# Patient Record
Sex: FEMALE | Race: WHITE | NOT HISPANIC OR LATINO | Employment: UNEMPLOYED | URBAN - METROPOLITAN AREA
[De-identification: names, ages, dates, MRNs, and addresses within clinical notes are randomized per-mention and may not be internally consistent; named-entity substitution may affect disease eponyms.]

---

## 2020-02-03 VITALS
SYSTOLIC BLOOD PRESSURE: 116 MMHG | DIASTOLIC BLOOD PRESSURE: 55 MMHG | TEMPERATURE: 98.2 F | BODY MASS INDEX: 26.68 KG/M2 | WEIGHT: 170 LBS | RESPIRATION RATE: 18 BRPM | OXYGEN SATURATION: 98 % | HEART RATE: 84 BPM | HEIGHT: 67 IN

## 2020-02-03 PROCEDURE — 99283 EMERGENCY DEPT VISIT LOW MDM: CPT

## 2020-02-04 ENCOUNTER — HOSPITAL ENCOUNTER (EMERGENCY)
Facility: HOSPITAL | Age: 25
Discharge: HOME | End: 2020-02-04
Attending: EMERGENCY MEDICINE | Admitting: EMERGENCY MEDICINE
Payer: COMMERCIAL

## 2020-02-04 ENCOUNTER — APPOINTMENT (EMERGENCY)
Dept: RADIOLOGY | Facility: HOSPITAL | Age: 25
End: 2020-02-04
Payer: COMMERCIAL

## 2020-02-04 DIAGNOSIS — S69.92XA INJURY TO FINGERNAIL OF LEFT HAND, INITIAL ENCOUNTER: Primary | ICD-10-CM

## 2020-02-04 PROCEDURE — 99284 EMERGENCY DEPT VISIT MOD MDM: CPT | Performed by: EMERGENCY MEDICINE

## 2020-02-04 PROCEDURE — 73140 X-RAY EXAM OF FINGER(S): CPT

## 2020-02-04 NOTE — ED PROVIDER NOTES
Pt Name: Marycruz Archuleta  MRN: [id number]  [place] 1995  Age/Sex: 21 y o  female  Date of evaluation: 2/3/2020  PCP: No primary care provider on file  CHIEF COMPLAINT    Chief Complaint   Patient presents with    Finger Injury     Patient reports she got her L hanf fourth digit fake nail stuck in a mixer and reports pain  HPI    21 y o  female presenting with pain in her left 4th fingernail  Patient states she has glue on fake nails, got that nail caught in a mixer, partially tearing and off  She now complains of severe pain to the area and is afraid that her fingernail may fall off  The pain is moderate intensity, sharp, in the finger tip, nonradiating, worse with pressing on the area and better at rest   She denies any other pain or injuries      HPI      Past Medical and Surgical History    History reviewed  No pertinent past medical history  History reviewed  No pertinent surgical history  History reviewed  No pertinent family history  Social History     Tobacco Use    Smoking status: Never Smoker    Smokeless tobacco: Never Used   Substance Use Topics    Alcohol use: Yes    Drug use: Never           Allergies    No Known Allergies    Home Medications    Prior to Admission medications    Not on File           Review of Systems    Review of Systems   Constitutional: Negative for activity change, chills and fever  HENT: Negative for drooling and facial swelling  Eyes: Negative for pain, discharge and visual disturbance  Respiratory: Negative for apnea, cough, chest tightness, shortness of breath and wheezing  Cardiovascular: Negative for chest pain and leg swelling  Gastrointestinal: Negative for abdominal pain, constipation, diarrhea, nausea and vomiting  Genitourinary: Negative for difficulty urinating, dysuria and urgency  Musculoskeletal: Negative for arthralgias, back pain and gait problem  Skin: Positive for wound  Negative for color change and rash  Neurological: Negative for dizziness, speech difficulty, weakness and headaches  Psychiatric/Behavioral: Negative for agitation, behavioral problems and confusion  All other systems reviewed and negative  Physical Exam      ED Triage Vitals [02/03/20 2330]   Temperature Pulse Respirations Blood Pressure SpO2   98 2 °F (36 8 °C) 84 18 116/55 98 %      Temp Source Heart Rate Source Patient Position - Orthostatic VS BP Location FiO2 (%)   Oral Monitor Lying Left arm --      Pain Score       6               Physical Exam   Constitutional: She is oriented to person, place, and time  She appears well-developed and well-nourished  HENT:   Head: Normocephalic and atraumatic  Eyes: Pupils are equal, round, and reactive to light  Conjunctivae and EOM are normal    Neck: Normal range of motion  Neck supple  Cardiovascular: Normal rate, regular rhythm, normal heart sounds and intact distal pulses  Pulmonary/Chest: Effort normal and breath sounds normal  No respiratory distress  She has no wheezes  She has no rales  Musculoskeletal: Normal range of motion  She exhibits tenderness  She exhibits no edema or deformity  Distal 25% of fingernail avulsed from the bed of the 4th finger on the left hand  Hemostatic  Tender to palpation  Neurological: She is alert and oriented to person, place, and time  Skin: Skin is warm and dry  No rash noted  No erythema  Psychiatric: She has a normal mood and affect  Her behavior is normal  Judgment and thought content normal    Nursing note and vitals reviewed  Diagnostic Results      Labs:    Results Reviewed     None          All labs reviewed and utilized in the medical decision making process    Radiology:    XR finger fourth digit-ring LEFT    (Results Pending)     No acute fracture or dislocation      All radiology studies independently viewed by me and interpreted by the radiologist     Procedure    Procedures    After discussion of risks and benefits, nail secured with medical adhesive applied circumferentially around the nail bed  ED Course of Care and Re-Assessments        Medications - No data to display        FINAL IMPRESSION    Final diagnoses:   Injury to fingernail of left hand, initial encounter         DISPOSITION/PLAN    Partial avulsion of the fingernail of the left 4th digit as above  No other associated injuries, no fracture seen on x-rays  Wound cleansed and nail secured with medical adhesive, placed in splint  Discharged strict return precautions, follow up primary care doctor  Time reflects when diagnosis was documented in both MDM as applicable and the Disposition within this note     Time User Action Codes Description Comment    2/4/2020  1:15 AM Desiree Cheng Add [S69 [id number]] Injury to fingernail of left hand, initial encounter       ED Disposition     ED Disposition Condition Date/Time Comment    Discharge Stable Tu Feb 4, 2020  1:14 AM Enid Said discharge to home/self care  Follow-up Information     Follow up With Specialties Details Why Contact Info Additional [address] [facility] Emergency Department Emergency Medicine Go to  If symptoms worsen [address] [facility] [address], [place], [address]    Your primary care doctor  Call in 1 day TO discuss this visit and schedule followup as needed              PATIENT REFERRED TO:    [address] Emergency Department  [address] [facility] [address] [phone]  Go to   If symptoms worsen    Your primary care doctor    Call in 1 day  TO discuss this visit and schedule followup as needed      DISCHARGE MEDICATIONS:    Patient's Medications    No medications on file       No discharge procedures on file           MD Angela Schmitz MD  02/04/20 0229

## 2020-06-16 ENCOUNTER — HOSPITAL ENCOUNTER (EMERGENCY)
Facility: HOSPITAL | Age: 25
Discharge: HOME | End: 2020-06-16
Attending: EMERGENCY MEDICINE | Admitting: EMERGENCY MEDICINE
Payer: COMMERCIAL

## 2020-06-16 VITALS
TEMPERATURE: 97.9 F | HEART RATE: 87 BPM | RESPIRATION RATE: 22 BRPM | OXYGEN SATURATION: 100 % | DIASTOLIC BLOOD PRESSURE: 88 MMHG | SYSTOLIC BLOOD PRESSURE: 130 MMHG

## 2020-06-16 DIAGNOSIS — N93.9 VAGINAL BLEEDING: Primary | ICD-10-CM

## 2020-06-16 PROCEDURE — 99284 EMERGENCY DEPT VISIT MOD MDM: CPT | Performed by: PHYSICIAN ASSISTANT

## 2020-06-16 PROCEDURE — 99283 EMERGENCY DEPT VISIT LOW MDM: CPT

## 2022-08-12 ENCOUNTER — OFFICE VISIT (OUTPATIENT)
Dept: URGENT CARE | Facility: CLINIC | Age: 27
End: 2022-08-12
Payer: COMMERCIAL

## 2022-08-12 VITALS
DIASTOLIC BLOOD PRESSURE: 70 MMHG | TEMPERATURE: 98.1 F | HEART RATE: 72 BPM | HEIGHT: 69 IN | BODY MASS INDEX: 31.55 KG/M2 | WEIGHT: 213 LBS | RESPIRATION RATE: 16 BRPM | OXYGEN SATURATION: 99 % | SYSTOLIC BLOOD PRESSURE: 120 MMHG

## 2022-08-12 DIAGNOSIS — S39.012A STRAIN OF MUSCLE, FASCIA AND TENDON OF LOWER BACK, INITIAL ENCOUNTER: Primary | ICD-10-CM

## 2022-08-12 PROCEDURE — 99213 OFFICE O/P EST LOW 20 MIN: CPT | Performed by: PHYSICIAN ASSISTANT

## 2022-08-12 RX ORDER — CYCLOBENZAPRINE HCL 10 MG
10 TABLET ORAL 3 TIMES DAILY PRN
Qty: 21 TABLET | Refills: 0 | Status: SHIPPED | OUTPATIENT
Start: 2022-08-12

## 2022-08-12 RX ORDER — SERTRALINE HYDROCHLORIDE 100 MG/1
100 TABLET, FILM COATED ORAL DAILY
COMMUNITY
Start: 2022-07-17

## 2022-08-12 RX ORDER — PREDNISONE 20 MG/1
40 TABLET ORAL DAILY
Qty: 10 TABLET | Refills: 0 | Status: SHIPPED | OUTPATIENT
Start: 2022-08-12 | End: 2022-08-17

## 2022-08-12 NOTE — PROGRESS NOTES
[facility]        NAME: Dyana Angel is a 31 y o  female  : 1995    MRN: [id number]  DATE: 2022  TIME: 6:40 PM    Assessment and Plan   Strain of muscle, fascia and tendon of lower back, initial encounter [S39 012A]  1  Strain of muscle, fascia and tendon of lower back, initial encounter  predniSONE 20 mg tablet    cyclobenzaprine (FLEXERIL) 10 mg tablet         Patient Instructions     Continue to monitor symptoms  If new or worsening symptoms develop, go immediately to Er  Drink plenty of fluids  Follow up with Family Doctor this week  Chief Complaint     Chief Complaint   Patient presents with    Back Pain     4 days covid + has terrible back pain, half way up back and hip area  History of Present Illness       Back Pain  This is a new problem  Episode onset: New onset low back pain started about 2 days ago  At 1st felt like she needed to [address] her back since then that has progressed to pain and tightness across low back  The problem occurs constantly  The problem has been gradually worsening since onset  The pain is present in the lumbar spine  The quality of the pain is described as aching  The pain does not radiate  The pain is severe  The pain is the same all the time  The symptoms are aggravated by twisting, bending and standing  Pertinent negatives include no abdominal pain, chest pain, dysuria, fever, numbness, pelvic pain or weakness  She has tried NSAIDs for the symptoms  The treatment provided mild relief  Patient was diagnosed with COVID last Saturday, 6 days ago  Patient cannot think of any activity that triggered this  Patient works at [employer] and states she does physical activity all the time but nothing new or strenuous    Review of Systems   Review of Systems   Constitutional: Negative  Negative for chills, fatigue and fever  HENT: Negative  Eyes: Negative  Respiratory: Negative    Negative for chest tightness, shortness of breath and wheezing  Cardiovascular: Negative  Negative for chest pain and palpitations  Gastrointestinal: Negative for abdominal pain, constipation, diarrhea, nausea and vomiting  Endocrine: Negative  Genitourinary: Negative for dysuria, flank pain, frequency, pelvic pain, vaginal discharge and vaginal pain  Musculoskeletal: Positive for back pain  Negative for gait problem, neck pain and neck stiffness  Skin: Negative  Allergic/Immunologic: Negative  Neurological: Negative  Negative for weakness and numbness  Hematological: Negative  Psychiatric/Behavioral: Negative  Current Medications       Current Outpatient Medications:     cyclobenzaprine (FLEXERIL) 10 mg tablet, Take 1 tablet (10 mg total) by mouth 3 (three) times a day as needed for muscle spasms, Disp: 21 tablet, Rfl: 0    predniSONE 20 mg tablet, Take 2 tablets (40 mg total) by mouth daily for 5 days, Disp: 10 tablet, Rfl: 0    sertraline (ZOLOFT) 100 mg tablet, Take 100 mg by mouth daily, Disp: , Rfl:     Current Allergies     Allergies as of 08/12/2022    (No Known Allergies)            The following portions of the patient's history were reviewed and updated as appropriate: allergies, current medications, past family history, past medical history, past social history, past surgical history and problem list      Past Medical History:   Diagnosis Date    No pertinent past medical history        Past Surgical History:   Procedure Laterality Date    MOUTH SURGERY  2021       History reviewed  No pertinent family history  Medications have been verified  Objective   /70   Pulse 72   Temp 98 1 °F (36 7 °C)   Resp 16   Ht 5' 9" (1 753 m)   Wt 96 6 kg (213 lb)   LMP 08/04/2022   SpO2 99%   BMI 31 45 kg/m²        Physical Exam     Physical Exam  Vitals and nursing note reviewed  Constitutional:       General: She is not in acute distress  Appearance: Normal appearance  She is well-developed   She is not ill-appearing or diaphoretic  HENT:      Head: Normocephalic and atraumatic  Cardiovascular:      Rate and Rhythm: Normal rate and regular rhythm  Heart sounds: Normal heart sounds  Pulmonary:      Effort: Pulmonary effort is normal  No respiratory distress  Breath sounds: Normal breath sounds  No wheezing, rhonchi or rales  Abdominal:      General: Bowel sounds are normal  There is no distension  Palpations: Abdomen is soft  Tenderness: There is no abdominal tenderness  There is no right CVA tenderness, left CVA tenderness, guarding or rebound  Comments: Abdominal exam completely denies   Musculoskeletal:      Comments: No midline point tenderness  Bilateral mild paraspinal muscle tenderness  Full range of motion of lumbar spine, worse with twisting in either direction  Negative straight leg raise  Negative sitting root  Strength sensation intact and symmetrical to lower extremities bilaterally  Skin:     General: Skin is warm  Coloration: Skin is not pale  Findings: No rash  Neurological:      Mental Status: She is alert

## 2022-08-12 NOTE — PATIENT INSTRUCTIONS
Continue to monitor symptoms  If new or worsening symptoms develop, go immediately to Er  Drink plenty of fluids  Follow up with Family Doctor this week  Low Back Strain   WHAT YOU NEED TO KNOW:   Low back strain is an injury to your lower back muscles or tendons  Tendons are strong tissues that connect muscles to bones  The lower back supports most of your body weight and helps you move, twist, and bend  DISCHARGE INSTRUCTIONS:   Return to the emergency department if:   You hear or feel a pop in your lower back  You have increased swelling or pain in your lower back  You have trouble moving your legs  Your legs are numb  Call your doctor if:   You have a fever  Your pain does not go away, even after treatment  You have questions or concerns about your condition or care  Medicines: The following medicines may be ordered by your healthcare provider:  Acetaminophen  decreases pain and fever  It is available without a doctor's order  Ask how much to take and how often to take it  Follow directions  Read the labels of all other medicines you are using to see if they also contain acetaminophen, or ask your doctor or pharmacist  Acetaminophen can cause liver damage if not taken correctly  Do not use more than 4 grams (4,000 milligrams) total of acetaminophen in one day  NSAIDs , such as ibuprofen, help decrease swelling, pain, and fever  This medicine is available with or without a doctor's order  NSAIDs can cause stomach bleeding or kidney problems in certain people  If you take blood thinner medicine, always ask your healthcare provider if NSAIDs are safe for you  Always read the medicine label and follow directions  Muscle relaxers  help decrease pain and muscle spasms  Prescription pain medicine  may be given  Ask your healthcare provider how to take this medicine safely  Some prescription pain medicines contain acetaminophen   Do not take other medicines that contain acetaminophen without talking to your healthcare provider  Too much acetaminophen may cause liver damage  Prescription pain medicine may cause constipation  Ask your healthcare provider how to prevent or treat constipation  Take your medicine as directed  Contact your healthcare provider if you think your medicine is not helping or if you have side effects  Tell him or her if you are allergic to any medicine  Keep a list of the medicines, vitamins, and herbs you take  Include the amounts, and when and why you take them  Bring the list or the pill bottles to follow-up visits  Carry your medicine list with you in case of an emergency  Self-care:   Rest  as directed  You may need to rest in bed for a period of time after your injury  Do not lift heavy objects  Apply ice  on your back for 15 to 20 minutes every hour or as directed  Use an ice pack, or put crushed ice in a plastic bag  Cover it with a towel  Ice helps prevent tissue damage and decreases swelling and pain  Apply heat  on your lower back for 20 to 30 minutes every 2 hours for as many days as directed  Heat helps decrease pain and muscle spasms  Slowly start to increase your activity  as the pain decreases, or as directed  Prevent another low back strain:   Use correct body movements  Bend at the hips and knees when you  objects  Do not bend from the waist  Use your leg muscles as you lift the load  Do not use your back  Keep the object close to your chest as you lift it  Try not to twist or lift anything above your waist          Change your position often when you stand for long periods of time  Rest one foot on a small box or footrest, and then switch to the other foot often  Try not to sit for long periods of time  When you do, sit in a straight-backed chair with your feet flat on the floor  Never reach, pull, or push while you are sitting  Warm up before you exercise  Do exercises that strengthen your back muscles   Ask your healthcare provider about the best exercise plan for you  Maintain a healthy weight  Ask your healthcare provider how much you should weigh  Ask him to help you create a weight loss plan if you are overweight  © Copyright [employer] 2022 Information is for End User's use only and may not be sold, redistributed or otherwise used for commercial purposes  All illustrations and images included in CareNotes® are the copyrighted property of A D A M , Inc  or [facility] Cherelle Lock   The above information is an  only  It is not intended as medical advice for individual conditions or treatments  Talk to your doctor, nurse or pharmacist before following any medical regimen to see if it is safe and effective for you

## 2023-03-11 ENCOUNTER — OFFICE VISIT (OUTPATIENT)
Dept: URGENT CARE | Facility: CLINIC | Age: 28
End: 2023-03-11

## 2023-03-11 VITALS
TEMPERATURE: 97.9 F | DIASTOLIC BLOOD PRESSURE: 88 MMHG | SYSTOLIC BLOOD PRESSURE: 129 MMHG | RESPIRATION RATE: 16 BRPM | OXYGEN SATURATION: 98 % | HEART RATE: 84 BPM
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
## 2023-03-11 DIAGNOSIS — S00.469A: ICD-10-CM

## 2023-03-11 DIAGNOSIS — R59.0 POSTERIOR AURICULAR LYMPHADENOPATHY: Primary | ICD-10-CM

## 2023-03-11 RX ORDER — METHYLPREDNISOLONE 4 MG/1
TABLET ORAL
Qty: 21 TABLET | Refills: 0 | Status: SHIPPED | OUTPATIENT
Start: 2023-03-11

## 2023-03-11 RX ORDER — CEPHALEXIN 500 MG/1
500 CAPSULE ORAL 4 TIMES DAILY
Qty: 40 CAPSULE | Refills: 0 | Status: SHIPPED | OUTPATIENT
Start: 2023-03-11 | End: 2023-03-21

## 2023-03-11 NOTE — PROGRESS NOTES
[facility]        NAME: Linette Garcia is a 28 y o  female  : 1995    MRN: [id number]  DATE: 2023  TIME: 12:28 PM    Assessment and Plan   Posterior auricular lymphadenopathy [R59 0]  1  Posterior auricular lymphadenopathy  cephalexin (KEFLEX) 500 mg capsule    vs cyst/lipoma      2  Insect bite of ear region, initial encounter  cephalexin (KEFLEX) 500 mg capsule    methylPREDNISolone (Medrol) 4 MG tablet therapy pack            Patient Instructions   Patient Instructions   1  Take meds as instructed  2  F/u with ENT in 1-2 weeks  3  F/u with PCP in 2-3 days        Follow up with PCP in 3-5 days  Proceed to  ER if symptoms worsen  Chief Complaint     Chief Complaint   Patient presents with   • Rash     To the touch achy rash above right ear since Tuesday  Also reports woozy feeling since Tuesday  Has vomited x2  Here for second opinion  History of Present Illness       Planing of a bump on the side of her head  Patient states she went to urgent care in [facility] yesterday and they told her she probably has shingles  Patient just wanted a second opinion  Symptoms started on Tuesday  Pt  Denies any burning, or pain, or any other symptoms  Review of Systems   Review of Systems   Constitutional: Negative for chills and fever  HENT: Negative for congestion and rhinorrhea  R side of head:  + swelling and palpable "lump" , 1" x 1", to superior aspect of upper ear region/R temporal area, with erythema; no evidence of vesicles  There is a bright erythematous area to posterior auricular region, possibly consistent with an "insect bite"  Eyes: Negative for discharge and visual disturbance  Respiratory: Negative for shortness of breath and wheezing  Cardiovascular: Negative for chest pain and palpitations  Gastrointestinal: Negative for abdominal pain and vomiting  Endocrine: Negative for polydipsia and polyuria     Genitourinary: Negative for dysuria and hematuria  Musculoskeletal: Negative for arthralgias, gait problem and neck stiffness  Skin: Positive for color change and rash  Negative for wound  Neurological: Negative for dizziness and headaches  Psychiatric/Behavioral: Negative for confusion and suicidal ideas  Current Medications       Current Outpatient Medications:   •  cephalexin (KEFLEX) 500 mg capsule, Take 1 capsule (500 mg total) by mouth 4 (four) times a day for 10 days, Disp: 40 capsule, Rfl: 0  •  methylPREDNISolone (Medrol) 4 MG tablet therapy pack, Use as directed on package, Disp: 21 tablet, Rfl: 0  •  sertraline (ZOLOFT) 100 mg tablet, Take 100 mg by mouth daily, Disp: , Rfl:   •  cyclobenzaprine (FLEXERIL) 10 mg tablet, Take 1 tablet (10 mg total) by mouth 3 (three) times a day as needed for muscle spasms (Patient not taking: Reported on 3/11/2023), Disp: 21 tablet, Rfl: 0    Current Allergies     Allergies as of 03/11/2023   • (No Known Allergies)            The following portions of the patient's history were reviewed and updated as appropriate: allergies, current medications, past family history, past medical history, past social history, past surgical history and problem list      Past Medical History:   Diagnosis Date   • No pertinent past medical history        Past Surgical History:   Procedure Laterality Date   • MOUTH SURGERY  2021       History reviewed  No pertinent family history  Medications have been verified  Objective   /88   Pulse 84   Temp 97 9 °F (36 6 °C) (Temporal)   Resp 16   LMP 03/03/2023 (Exact Date)   SpO2 98%        Physical Exam     Physical Exam  Vitals and nursing note reviewed  Constitutional:       Appearance: Normal appearance  HENT:      Head: Normocephalic and atraumatic        Right Ear: Tympanic membrane, ear canal and external ear normal       Left Ear: Tympanic membrane, ear canal and external ear normal    Eyes:      Extraocular Movements: Extraocular movements intact  Conjunctiva/sclera: Conjunctivae normal    Cardiovascular:      Rate and Rhythm: Normal rate  Pulmonary:      Effort: Pulmonary effort is normal    Musculoskeletal:      Cervical back: Normal range of motion  Skin:     Findings: Erythema and lesion present  Comments: See photos below of R posterior auricular region   Neurological:      General: No focal deficit present  Mental Status: She is alert  Mental status is at baseline     Psychiatric:         Mood and Affect: Mood normal

## 2023-05-02 LAB
EXTERNAL HIV SCREEN: NORMAL
HCV AB SER-ACNC: NON REACTIVE

## 2023-05-15 ENCOUNTER — OFFICE VISIT (OUTPATIENT)
Dept: OBGYN CLINIC | Facility: CLINIC | Age: 28
End: 2023-05-15

## 2023-05-15 VITALS
BODY MASS INDEX: 33.11 KG/M2 | WEIGHT: 206 LBS | HEIGHT: 66 IN | DIASTOLIC BLOOD PRESSURE: 70 MMHG | SYSTOLIC BLOOD PRESSURE: 118 MMHG

## 2023-05-15 DIAGNOSIS — Z31.41 FERTILITY TESTING: Primary | ICD-10-CM

## 2023-05-15 NOTE — PROGRESS NOTES
Assessment/Plan:      Diagnoses and all orders for this visit:    Fertility testing  Will call day 1 of next cycle to schedule HSG  Aware one of my partners will be completing either at [facility] or [facility]  F/u for annual exam      Other orders  -     sertraline (ZOLOFT) 50 mg tablet; Take 50 mg by mouth daily          Subjective:     Patient ID: Chang Toney is a 28 y o  female  27 yo G0 presents to establish care in order to complete HSG  She has been trying to conceive for 3 years  She has been seeing [facility] from [facility] - this practice is covered by her insurance  Work up so far has been reassuring for both her and her   However, sonohysterography recently completed showed ?arcuate vs septate uterus and concern for occluded b/l fallopian tubes  She was told she needs hsg done, but this should be completed in [place] due to insurance     last pap: 2019 -neg   LMP: 5/6/23, periods are regular but has a lot of cramping and bleeding in between            Review of Systems   All other systems reviewed and are negative  Objective:     Physical Exam  Vitals reviewed  Pulmonary:      Effort: Pulmonary effort is normal    Neurological:      Mental Status: She is alert and oriented to person, place, and time     Psychiatric:         Behavior: Behavior normal

## 2023-05-30 NOTE — PATIENT INSTRUCTIONS
Breast Self Exam for Women   AMBULATORY CARE:   A breast self-exam (BSE)  is a way to check your breasts for lumps and other changes  Regular BSEs can help you know how your breasts normally look and feel  Most breast lumps or changes are not cancer, but you should always have them checked by a healthcare provider  Why you should do a BSE:  Breast cancer is the most common type of cancer in women  Even if you have mammograms, you may still want to do a BSE regularly  If you know how your breasts normally feel and look, it may help you know when to contact your healthcare provider  Mammograms can miss some cancers  You may find a lump during a BSE that did not show up on a mammogram   When you should do a BSE:  If you have periods, you may want to do your BSE 1 week after your period ends  This is the time when your breasts may be the least swollen, lumpy, or tender  You can do regular BSEs even if you are breastfeeding or have breast implants  Call your doctor if:   You find any lumps or changes in your breasts  You have breast pain or fluid coming from your nipples  You have questions or concerns about your condition or care  How to do a BSE:       Look at your breasts in a mirror  Look at the size and shape of each breast and nipple  Check for swelling, lumps, dimpling, scaly skin, or other skin changes  Look for nipple changes, such as a nipple that is painful or beginning to pull inward  Gently squeeze both nipples and check to see if fluid (that is not breast milk) comes out of them  If you find any of these or other breast changes, contact your healthcare provider  Check your breasts while you sit or  the following 3 positions:    Wells your arms down at your sides  Raise your hands and join them behind your head  Put firm pressure with your hands on your hips  Bend slightly forward while you look at your breasts in the mirror  Lie down and feel your breasts    When you lie down, your breast tissue spreads out evenly over your chest  This makes it easier for you to feel for lumps and anything that may not be normal for your breasts  Do a BSE on one breast at a time  Place a small pillow or towel under your left shoulder  Put your left arm behind your head  Use the 3 middle fingers of your right hand  Use your fingertip pads, on the top of your fingers  Your fingertip pad is the most sensitive part of your finger  Use small circles to feel your breast tissue  Use your fingertip pads to make dime-sized, overlapping circles on your breast and armpits  Use light, medium, and firm pressure  First, press lightly  Second, press with medium pressure to feel a little deeper into the breast  Last, use firm pressure to feel deep within your breast     Examine your entire breast area  Examine the breast area from above the breast to below the breast where you feel only ribs  Make small circles with your fingertips, starting in the middle of your armpit  Make circles going up and down the breast area  Continue toward your breast and all the way across it  Examine the area from your armpit all the way over to the middle of your chest (breastbone)  Stop at the middle of your chest     Move the pillow or towel to your right shoulder, and put your right arm behind your head  Use the 3 fingertip pads of your left hand, and repeat the above steps to do a BSE on your right breast   What else you can do to check for breast problems or cancer:  Talk to your healthcare provider about mammograms  A mammogram is an x-ray of your breasts to screen for breast cancer or other problems  Your provider can tell you the benefits and risks of mammograms  The first mammogram is usually at age 39 or 48  Your provider may recommend you start at 36 or younger if your risk for breast cancer is high  Mammograms usually continue every 1 to 2 years until age 76         Follow up with your doctor as directed:  Write down your questions so you remember to ask them during your visits  © Amanda Sammie Moreno 2022 Information is for End User's use only and may not be sold, redistributed or otherwise used for commercial purposes  The above information is an  only  It is not intended as medical advice for individual conditions or treatments  Talk to your doctor, nurse or pharmacist before following any medical regimen to see if it is safe and effective for you  Wellness Visit for Adults   AMBULATORY CARE:   A wellness visit  is when you see your healthcare provider to get screened for health problems  Your healthcare provider will also give you advice on how to stay healthy  Write down your questions so you remember to ask them  Ask your healthcare provider how often you should have a wellness visit  What happens at a wellness visit:  Your healthcare provider will ask about your health, and your family history of health problems  This includes high blood pressure, heart disease, and cancer  He or she will ask if you have symptoms that concern you, if you smoke, and about your mood  You may also be asked about your intake of medicines, supplements, food, and alcohol  Any of the following may be done: Your weight  will be checked  Your height may also be checked so your body mass index (BMI) can be calculated  Your BMI shows if you are at a healthy weight  Your blood pressure  and heart rate will be checked  Your temperature may also be checked  Blood and urine tests  may be done  Blood tests may be done to check your cholesterol levels  Abnormal cholesterol levels increase your risk for heart disease and stroke  You may also need a blood or urine test to check for diabetes if you are at increased risk  Urine tests may be done to look for signs of an infection or kidney disease  A physical exam  includes checking your heartbeat and lungs with a stethoscope   Your healthcare provider may also check your skin to look for sun damage  Screening tests  may be recommended  A screening test is done to check for diseases that may not cause symptoms  The screening tests you may need depend on your age, gender, family history, and lifestyle habits  For example, colorectal screening may be recommended if you are 48years old or older  Screening tests you need if you are a woman:   A Pap smear  is used to screen for cervical cancer  Pap smears are usually done every 3 to 5 years depending on your age  You may need them more often if you have had abnormal Pap smear test results in the past  Ask your healthcare provider how often you should have a Pap smear  A mammogram  is an x-ray of your breasts to screen for breast cancer  Experts recommend mammograms every 2 years starting at age 48 years  You may need a mammogram at age 52 years or younger if you have an increased risk for breast cancer  Talk to your healthcare provider about when you should start having mammograms and how often you need them  Vaccines you may need:   Get an influenza vaccine  every year  The influenza vaccine protects you from the flu  Several types of viruses cause the flu  The viruses change over time, so new vaccines are made each year  Get a tetanus-diphtheria (Td) booster vaccine  every 10 years  This vaccine protects you against tetanus and diphtheria  Tetanus is a severe infection that may cause painful muscle spasms and lockjaw  Diphtheria is a severe bacterial infection that causes a thick covering in the back of your mouth and throat  Get a human papillomavirus (HPV) vaccine  if you are female and aged 23 to 32 or male 23 to 24 and never received it  This vaccine protects you from HPV infection  HPV is the most common infection spread by sexual contact  HPV may also cause vaginal, penile, and anal cancers  Get a pneumococcal vaccine  if you are aged 72 years or older   The pneumococcal vaccine is an injection given to protect you from pneumococcal disease  Pneumococcal disease is an infection caused by pneumococcal bacteria  The infection may cause pneumonia, meningitis, or an ear infection  Get a shingles vaccine  if you are 60 or older, even if you have had shingles before  The shingles vaccine is an injection to protect you from the varicella-zoster virus  This is the same virus that causes chickenpox  Shingles is a painful rash that develops in people who had chickenpox or have been exposed to the virus  How to eat healthy:  My Plate is a model for planning healthy meals  It shows the types and amounts of foods that should go on your plate  Fruits and vegetables make up about half of your plate, and grains and protein make up the other half  A serving of dairy is included on the side of your plate  The amount of calories and serving sizes you need depends on your age, gender, weight, and height  Examples of healthy foods are listed below:  Eat a variety of vegetables  such as dark green, red, and orange vegetables  You can also include canned vegetables low in sodium (salt) and frozen vegetables without added butter or sauces  Eat a variety of fresh fruits , canned fruit in 100% juice, frozen fruit, and dried fruit  Include whole grains  At least half of the grains you eat should be whole grains  Examples include whole-wheat bread, wheat pasta, brown rice, and whole-grain cereals such as oatmeal     Eat a variety of protein foods such as seafood (fish and shellfish), lean meat, and poultry without skin (turkey and chicken)  Examples of lean meats include pork leg, shoulder, or tenderloin, and beef round, sirloin, tenderloin, and extra lean ground beef  Other protein foods include eggs and egg substitutes, beans, peas, soy products, nuts, and seeds  Choose low-fat dairy products such as skim or 1% milk or low-fat yogurt, cheese, and cottage cheese  Limit unhealthy fats  such as butter, hard margarine, and shortening  Exercise:  Exercise at least 30 minutes per day on most days of the week  Some examples of exercise include walking, biking, dancing, and swimming  You can also fit in more physical activity by taking the stairs instead of the elevator or parking farther away from stores  Include muscle strengthening activities 2 days each week  Regular exercise provides many health benefits  It helps you manage your weight, and decreases your risk for type 2 diabetes, heart disease, stroke, and high blood pressure  Exercise can also help improve your mood  Ask your healthcare provider about the best exercise plan for you  General health and safety guidelines:   Do not smoke  Nicotine and other chemicals in cigarettes and cigars can cause lung damage  Ask your healthcare provider for information if you currently smoke and need help to quit  E-cigarettes or smokeless tobacco still contain nicotine  Talk to your healthcare provider before you use these products  Limit alcohol  A drink of alcohol is 12 ounces of beer, 5 ounces of wine, or 1½ ounces of liquor  Lose weight, if needed  Being overweight increases your risk of certain health conditions  These include heart disease, high blood pressure, type 2 diabetes, and certain types of cancer  Protect your skin  Do not sunbathe or use tanning beds  Use sunscreen with a SPF 15 or higher  Apply sunscreen at least 15 minutes before you go outside  Reapply sunscreen every 2 hours  Wear protective clothing, hats, and sunglasses when you are outside  Drive safely  Always wear your seatbelt  Make sure everyone in your car wears a seatbelt  A seatbelt can save your life if you are in an accident  Do not use your cell phone when you are driving  This could distract you and cause an accident  Pull over if you need to make a call or send a text message  Practice safe sex  Use latex condoms if are sexually active and have more than one partner   Your healthcare provider may recommend screening tests for sexually transmitted infections (STIs)  Wear helmets, lifejackets, and protective gear  Always wear a helmet when you ride a bike or motorcycle, go skiing, or play sports that could cause a head injury  Wear protective equipment when you play sports  Wear a lifejacket when you are on a boat or doing water sports  © Copyright [facility] 2022 Information is for End User's use only and may not be sold, redistributed or otherwise used for commercial purposes  The above information is an  only  It is not intended as medical advice for individual conditions or treatments  Talk to your doctor, nurse or pharmacist before following any medical regimen to see if it is safe and effective for you  HPV (Human Papillomavirus)   AMBULATORY CARE:   Human Papillomavirus (HPV)  is the name for a group of viruses that can infect your skin or other parts of your body  HPV is the most common infection spread by sexual contact  It can also be spread from a mother to her baby during delivery  Common symptoms include the following:   Painless warts in your mouth or on your genitals    Genital or anal discharge, bleeding, itching, or pain    Pain when you urinate    Call your doctor if:   You have new or worsening symptoms  You have questions or concerns about your condition or care  HPV diagnosis:  Your healthcare provider may use a vinegar liquid to help diagnose HPV genital warts  Women 27to 72years old can be checked for HPV during regular cervical cancer screenings  An HPV test checks for certain types of HPV that can cause changes in cervical cells  Without treatment, the changed cells can become cancer  An HPV test can be done every 5 years if the results show no infection  The test can be done with or without a Pap smear  A Pap smear checks for cancer or for abnormal cells that can become cancer   You may be tested for HPV if you have mouth or throat cancer  Treatment:  HPV cannot be cured, but an infection may go away on its own in about 2 years without causing problems  If the infection continues, some types of HPV can lead to health conditions that need to be treated  Examples include warts and certain cancers, especially squamous cell carcinoma (SCC)  HPV-linked SCCs commonly develop in the anus, throat (called oropharyngeal cancer), cervix, vagina, penis, or mouth  HPV can also cause a type of cervical cancer called adenocarcinoma  Symptoms of any of these conditions may not develop for several years after you were exposed to HPV  You will need to be monitored closely  Ask your healthcare provider for more information about monitoring, conditions caused by HPV, and available treatments  Prevent an HPV infection:  HPV is usually spread through sexual activity  The following can help prevent infection:  Ask about the HPV vaccine  The HPV vaccine is given to females and males, usually at 6or 15years of age  It can be given from 9 years through 39years of age, if needed  It is most effective if given before sexual activity begins  Use a new condom, contraceptive barrier, or dental dam each time you have sex  This includes oral, vaginal, and anal sex  Talk to your healthcare provider if you have any questions about what to use or how to use it  Follow up with your doctor as directed:  Write down your questions so you remember to ask them during your visits  © Copyright Sanjuanita Aguilar 2022 Information is for End User's use only and may not be sold, redistributed or otherwise used for commercial purposes  The above information is an  only  It is not intended as medical advice for individual conditions or treatments  Talk to your doctor, nurse or pharmacist before following any medical regimen to see if it is safe and effective for you         Perineal Hygiene      Your vaginal naturally takes care of its self, it is a self washing system, the less you mess the healthier it will be     No soaps or feminine wash to the vulva, these products can cause dermitis, bacterial infections and other vulvar problems  Use only water to cleanse, or water with Dove or [employer] if necessary  No scented lotions or products are advised in or near your vulva  Use only coconut oil for moisture if needed  No douching this may cause imbalance in your vaginal PH and further issues  If you wear panty liners, you may apply a thin coating of Vaseline, A&D ointment or coconut oil to the vulvar tissues as a skin barrier     Cotton underware, loose fitting clothing  Only perfume-free, dye-free laundry detergent, use a second rinse cycle   Avoid fabric softeners/dryer sheets  Partner should avoid the same products as well  Over the counter probiotic to restore vaginal fredy may be helpful as well, take daily  You may also look into Boric Acid vaginal suppositories to restore vaginal PH balance for up to 2 weeks as directed on the box  You may not use these if you are pregnant      For vaginal dryness: You may use:     Coconut oil (organic, pure, unscented) as needed for moisture or lubrication  ( Do not use if allergic)       Replens moisture restore external comfort gel daily ( use as directed on the box)        Replens long lasting vaginal moisturizer  ( use as directed on the box)         For Vaginal Lubrication:          You may use:     Coconut oil (organic, pure, unscented) as a lubricant or another scent-free lubricant (Astroglide, Uberlube) if needed  Do not use coconut oil or silicone if using a condom as this may break down the integrity of the condom and cause an unplanned pregnancy              Do not use coconut oil if allergic               Replens silky smooth lubricant, premium silicone based lubricant for intercourse   ( use as directed, a small amount will provide an enhanced natural feeling)     Any premium over the counter vaginal lubricant water or silicone based  Silicone based will have more staying power  Female Infertility   AMBULATORY CARE:   Female infertility  means you are not pregnant after 1 year of regular unprotected sex with the same partner  If you are older than 28, infertility is after 6 months of regular unprotected sex with the same partner  Infertility may also mean that you keep getting pregnant but have miscarriages or stillbirths  Call your doctor or obstetrician if:   You have questions or concerns about your condition or care  Treatment  depends on the cause:  Medicines  may be used to cause ovulation or to release more hormones that stimulate ovulation  Some medicines change when you ovulate, especially if you do not ovulate every month  You may need medicine to lower your male hormone levels if you have insulin resistance or polycystic ovary syndrome (PCOS)  Intrauterine insemination  is a procedure used to put sperm directly into your uterus  It may also be called artificial insemination  This procedure may be used if your partner has male factor infertility  It may also be used if the natural mucus around your cervix is too thick to let sperm pass  You may be given medicines that cause ovulation before you have this procedure  Surgery  may be used to find the cause of your infertility or to fix a problem preventing pregnancy  Endometrial tissues that are growing in the wrong places may be removed  Healthcare providers may also repair blockages or other problems in your fallopian tubes  Assisted reproductive technology (ART)  can be done in several ways  An unfertilized egg may be placed into your fallopian tube along with sperm  This allows fertilization to happen inside your body  Your eggs may be removed and fertilized by sperm outside your body  Then the fertilized eggs (embryos) are put back into your uterus or fallopian tubes   Your ability to become pregnant is increased when more than one embryo is used  Sometimes all or several of the embryos attach  You may have 2 or more babies if this happens  Your obstetrician can tell you more about this risk  What you can do to increase your fertility:   Create a healthy lifestyle  Talk to your healthcare provider about a healthy weight for you  You may develop uterine fibroids or an ovulation disorder if your weight is too high or too low  Healthcare providers can help you create healthy meal and exercise plans if you need to lose or gain weight  Do not drink alcohol, smoke cigarettes, or use illegal drugs  Any of these can cause infertility  Ask your healthcare provider for information if you need help quitting  Ask about ways to manage stress  Stress can make pregnancy more difficult  Stress can be hard to manage, especially if infertility is causing the stress  Stress can become worse the longer infertility continues  Try to find ways to help yourself relax  Examples include going for a walk, getting a massage, and talking with a friend  A regular sleep schedule can also help lower stress  Talk to your healthcare provider if you continue to have problems managing stress  For support and more information:   [employer] [place] [facility] Information [employer]semin[employer]  [address]  [id number] [facility] , [address] [place] [address]  Phone: [phone]  Web Address: [email or web address]  Sudha MUNGUIA [facility]  [facility]  Phone: [phone]  Web Address: [facility]  [facility]  Follow up with your doctor or obstetrician as directed: You may need tests to monitor ovulation or manage fertility medicines  Write down your questions so you remember to ask them during your visits  © Copyright Joselin Fails 2022 Information is for End User's use only and may not be sold, redistributed or otherwise used for commercial purposes  The above information is an  only  It is not intended as medical advice for individual conditions or treatments  Talk to your doctor, nurse or pharmacist before following any medical regimen to see if it is safe and effective for you

## 2023-05-30 NOTE — PROGRESS NOTES
LMP: Patient's last menstrual period was 2023 (exact date)    PMB:  SA:  YES   HPV:  YES all 3 doses   Birth control:  NONE   Last pap: 12/10/2019 Negative ([facility])   Last mammo: Not on file:  Last colonoscopy: Not on file  Last Dexa: Not on file  Family History:    MGM- ovarian cancer   (D)   PGM- ovarian (D)

## 2023-05-30 NOTE — PROGRESS NOTES
Diagnoses and all orders for this visit:    Encounter for gynecological examination without abnormal finding  -     Liquid-based pap, screening    Family hx of ovarian malignancy  -     Ambulatory Referral to Oncology Genetics; Future    Other orders  -     folic acid (FOLVITE) 1 mg tablet; Take 1 mg by mouth daily  -     cyanocobalamin (VITAMIN B-12) 100 mcg tablet; Take 50 mcg by mouth daily        Perineal hygiene reviewed   Weight bearing exercises minium of 150 mins/weekly advised  Kegel exercises recommended  SBE encouraged, ASCCP guidelines reviewed  Condoms encouraged with all sexual activity to prevent STI's  Gardisil vaccines recommended up to age 39  Calcium/ Vit D dietary requirements discussed,   Advised to call with any issues,  all concerns & questions addressed  See provided information in your after visit summary     F/U Annually and PRN      Health Maintenance:    Last PAP: 12/10/2019 Neg  Next PAP Due: collected today     Gardisil: Completed / Not completed       Subjective    CC: Yearly Exam      Mey Villatoro is a 28 y o  female here for an annual exam  [place]  GYN hx includes:  Infertility attempting pregnancy for past 3 years, following with [facility] in [place] ( this is covered by her insurance), Work up so far has been reassuring for both her and her   However, sonohysterography recently completed showed ? arcuate vs septate uterus and concern for occluded b/l fallopian tubes  She was told she needs hsg done, will complete with our office staff, will call with onset of next menses which should be in the next week  No personal Hx of breast, cervical, ovarian or colon CA  Family hx of: MGM & PGM with ovarian cancer  Genetic oncology referral placed, my chart message sent to pt to make her aware of referral     Medically stable, reports no changes in medical Hx, follows with PMD    Patient's last menstrual period was 05/06/2023 (exact date)    Her menstrual cycles are regular every 28-30 days  She denies issues with bleeding during her menses  Denies history of abnormal pap smear  She denies breast concerns, abnormal vaginal discharge, vaginal itching, odor, irritation, bowel/bladder dysfunction, urinary symptoms, pelvic pain, or dyspareunia today  She is sexually active  Monogamous relationship  Her current method of contraception includes none  Denies any issues with her BCM  She does want STD testing today    Denies intimate partner violence    [place]  for [employer]     Past Medical History:   Diagnosis Date   • Female infertility      Past Surgical History:   Procedure Laterality Date   • MOUTH SURGERY     • WISDOM TOOTH EXTRACTION Left        Immunization History   Administered Date(s) Administered   • COVID-19 PFIZER VACCINE 0 3 ML IM 2021       Family History   Problem Relation Age of Onset   • Ovarian cancer Maternal Grandmother      Social History     Tobacco Use   • Smoking status: Every Day     Types: Cigarettes   • Smokeless tobacco: Never   Vaping Use   • Vaping Use: Former   Substance Use Topics   • Alcohol use: Not Currently   • Drug use: Yes     Types: Marijuana       Current Outpatient Medications:   •  cyanocobalamin (VITAMIN B-12) 100 mcg tablet, Take 50 mcg by mouth daily, Disp: , Rfl:   •  folic acid (FOLVITE) 1 mg tablet, Take 1 mg by mouth daily, Disp: , Rfl:   •  sertraline (ZOLOFT) 50 mg tablet, Take 50 mg by mouth daily, Disp: , Rfl:   Patient Active Problem List    Diagnosis Date Noted   • Tobacco dependence 2023   • Infertility counseling 11/10/2022   • History of psychiatric treatment 2022   • Bipolar 1 disorder ([facility] [address] ) 2021   • Vaginal bleeding 2020       No Known Allergies    OB History    Para Term  AB Living   0 0 0 0 0 0   SAB IAB Ectopic Multiple Live Births   0 0 0 0 0       Vitals:    23 1345   BP: 122/68   BP Location: Left arm   Patient Position: Sitting   Cuff Size: "Large   Weight: 93 9 kg (207 lb)   Height: 5' 5\" (1 651 m)     Body mass index is 34 45 kg/m²  Review of Systems     Constitutional: Negative for chills, fatigue, fever, headaches, visual disturbances, and unexpected weight change  Respiratory: Negative for cough, & shortness of breath  Cardiovascular: Negative for chest pain       Gastrointestinal: Negative for Abd pain, nausea & vomiting, constipation and diarrhea  Genitourinary: Negative for difficulty urinating, dysuria, hematuria, dyspareunia, unusual vaginal bleeding or discharge  Skin: Negative skin changes    Physical Exam     Constitutional: Alert & Oriented x3, well-developed and well-nourished  No distress  HENT: Atraumatic, Normocephalic, Conjunctivae clear  Neck: Normal range of motion  Neck supple  No thyromegaly, mass, nodules or tenderness  Pulmonary: Effort normal    Abdominal: Soft  No tenderness or masses  Musculoskeletal: Normal ROM  Skin: Warm & Dry  Psychological: Normal mood, thought content, behavior & judgement     Breasts:   Right: tissue soft without masses, tenderness, skin changes or nipple discharge  No areas of erythema or pain  No subclavicular, axillary, pectoral adenopathy  Left:  tissue soft without masses, tenderness, skin changes or nipple discharge  No areas of erythema or pain  No subclavicular, axillary, pectoral adenopathy    Pelvic exam was performed with patient supine, lithotomy position  Labia: Negative rash, tenderness, lesion or injury on the right labia  Negative rash, tenderness, lesion or injury on the left labia  Urethral meatus:  Negative for  tenderness, inflammation or discharge  Uterus: not deviated, enlarged, fixed or tender  Cervix: No CMT, no discharge or friability  Right adnexa: no mass, no tenderness and no fullness  Left adnexa: no mass, no tenderness and no fullness  Vagina: No erythema, tenderness, masses, or foreign body in the vagina   No signs of injury around " the vagina  No unusual vaginal discharge   Perineum without lesions, signs of injury, erythema or swelling  Inguinal Canal:        Right: No inguinal adenopathy or hernia present  Left: No inguinal adenopathy or hernia present

## 2023-05-31 ENCOUNTER — ANNUAL EXAM (OUTPATIENT)
Dept: OBGYN CLINIC | Facility: CLINIC | Age: 28
End: 2023-05-31

## 2023-05-31 VITALS
SYSTOLIC BLOOD PRESSURE: 122 MMHG | BODY MASS INDEX: 34.49 KG/M2 | HEIGHT: 65 IN | DIASTOLIC BLOOD PRESSURE: 68 MMHG | WEIGHT: 207 LBS

## 2023-05-31 DIAGNOSIS — Z80.41 FAMILY HX OF OVARIAN MALIGNANCY: ICD-10-CM

## 2023-05-31 DIAGNOSIS — Z01.419 ENCOUNTER FOR GYNECOLOGICAL EXAMINATION WITHOUT ABNORMAL FINDING: Primary | ICD-10-CM

## 2023-05-31 PROBLEM — Z92.89 HISTORY OF PSYCHIATRIC TREATMENT: Status: ACTIVE | Noted: 2022-05-03

## 2023-05-31 PROBLEM — F17.200 TOBACCO DEPENDENCE: Status: ACTIVE | Noted: 2023-05-31

## 2023-05-31 PROBLEM — Z31.69 INFERTILITY COUNSELING: Status: ACTIVE | Noted: 2022-11-10

## 2023-05-31 PROBLEM — B00.9 HSV INFECTION: Status: ACTIVE | Noted: 2022-01-12

## 2023-05-31 PROBLEM — F31.9 BIPOLAR 1 DISORDER (HCC): Status: ACTIVE | Noted: 2021-11-18

## 2023-05-31 PROCEDURE — G0145 SCR C/V CYTO,THINLAYER,RESCR: HCPCS | Performed by: OBSTETRICS & GYNECOLOGY

## 2023-05-31 RX ORDER — UBIDECARENONE 75 MG
50 CAPSULE ORAL DAILY
COMMUNITY

## 2023-05-31 RX ORDER — FOLIC ACID 1 MG/1
1 TABLET ORAL DAILY
COMMUNITY

## 2023-06-01 ENCOUNTER — TELEPHONE (OUTPATIENT)
Dept: OBGYN CLINIC | Facility: CLINIC | Age: 28
End: 2023-06-01

## 2023-06-01 DIAGNOSIS — N97.0 INFERTILITY ASSOCIATED WITH ANOVULATION: Primary | ICD-10-CM

## 2023-06-01 NOTE — TELEPHONE ENCOUNTER
Pt following up on HSG  Spoke to [place] and Arveyes  Arveyes will look into and call pt back  Pt stated she works overnight tonight so if she doesn't answer to just put her in and leave her a message (setting up vm right now)

## 2023-06-01 NOTE — TELEPHONE ENCOUNTER
Pt seen with Arpan Tapia 5/31 and calling as she needs an HSG test,  Please reach out to pt to inform her,   Thanks

## 2023-06-01 NOTE — TELEPHONE ENCOUNTER
Papo Kerns is trying to set up hsg for pt  I called Central scheduling and the person who schedules them at [facility] in [place] will be in tomorrow   Andrea will call Beni Durán [phone] tomorrow - 6/8 at 11:45

## 2023-06-02 ENCOUNTER — TELEPHONE (OUTPATIENT)
Dept: HEMATOLOGY ONCOLOGY | Facility: CLINIC | Age: 28
End: 2023-06-02

## 2023-06-02 NOTE — TELEPHONE ENCOUNTER
Ida Parr at [facility]  Scheduled HSG for pt , Thursday, June 8 at 1145am , with Dr Danelle Jacobs  Pt to arrive at 1130  called pt, per communication consent, LEFT MESSAGE informing pt of date & time, &  location of procedure  Advised pt return call to confirm message was received  - My direct ext was given  AGA Valadez to add to Dr Lucy Finch schedule

## 2023-06-02 NOTE — TELEPHONE ENCOUNTER
I called [facility] to schedule a new patient appointment with the [facility]       Outcome:   I left a voice message encouraging the patient to call the [facility] [place] at [phone] to schedule this appointment  A mychart message was also send with our contact information  Follow-up:   At this time the referral will be closed and we will wait to hear back from the patient regarding scheduling this appointment

## 2023-06-08 LAB
LAB AP GYN PRIMARY INTERPRETATION: NORMAL
Lab: NORMAL

## 2023-06-13 ENCOUNTER — TELEPHONE (OUTPATIENT)
Dept: OBGYN CLINIC | Facility: CLINIC | Age: 28
End: 2023-06-13

## 2023-06-13 ENCOUNTER — APPOINTMENT (EMERGENCY)
Dept: ULTRASOUND IMAGING | Facility: HOSPITAL | Age: 28
End: 2023-06-13
Payer: COMMERCIAL

## 2023-06-13 ENCOUNTER — HOSPITAL ENCOUNTER (EMERGENCY)
Facility: HOSPITAL | Age: 28
Discharge: HOME | End: 2023-06-13
Attending: EMERGENCY MEDICINE | Admitting: EMERGENCY MEDICINE
Payer: COMMERCIAL

## 2023-06-13 VITALS
RESPIRATION RATE: 17 BRPM | OXYGEN SATURATION: 97 % | TEMPERATURE: 98 F | HEART RATE: 62 BPM | SYSTOLIC BLOOD PRESSURE: 125 MMHG | DIASTOLIC BLOOD PRESSURE: 73 MMHG

## 2023-06-13 DIAGNOSIS — R10.2 PELVIC PAIN: Primary | ICD-10-CM

## 2023-06-13 LAB
ALBUMIN SERPL BCP-MCNC: 5 G/DL (ref 3.5–5)
ALP SERPL-CCNC: 53 U/L (ref 34–104)
ALT SERPL W P-5'-P-CCNC: 11 U/L (ref 7–52)
ANION GAP SERPL CALCULATED.3IONS-SCNC: 8 MMOL/L (ref 4–13)
AST SERPL W P-5'-P-CCNC: 14 U/L (ref 13–39)
BASOPHILS # BLD AUTO: 0.04 THOUSANDS/ÂΜL (ref 0–0.1)
BASOPHILS NFR BLD AUTO: 0 % (ref 0–1)
BILIRUB SERPL-MCNC: 0.34 MG/DL (ref 0.2–1)
BILIRUB UR QL STRIP: NEGATIVE
BUN SERPL-MCNC: 15 MG/DL (ref 5–25)
CALCIUM SERPL-MCNC: 9.8 MG/DL (ref 8.4–10.2)
CHLORIDE SERPL-SCNC: 106 MMOL/L (ref 96–108)
CLARITY UR: CLEAR
CO2 SERPL-SCNC: 26 MMOL/L (ref 21–32)
COLOR UR: NORMAL
CREAT SERPL-MCNC: 0.76 MG/DL (ref 0.6–1.3)
EOSINOPHIL # BLD AUTO: 0.38 THOUSAND/ÂΜL (ref 0–0.61)
EOSINOPHIL NFR BLD AUTO: 3 % (ref 0–6)
ERYTHROCYTE [DISTWIDTH] IN BLOOD BY AUTOMATED COUNT: 13 % (ref 11.6–15.1)
GFR SERPL CREATININE-BSD FRML MDRD: 107 ML/MIN/1.73SQ M
GLUCOSE SERPL-MCNC: 84 MG/DL (ref 65–140)
GLUCOSE UR STRIP-MCNC: NEGATIVE MG/DL
HCG SERPL QL: NEGATIVE
HCT VFR BLD AUTO: 41.1 % (ref 34.8–46.1)
HGB BLD-MCNC: 13.8 G/DL (ref 11.5–15.4)
HGB UR QL STRIP.AUTO: NEGATIVE
IMM GRANULOCYTES # BLD AUTO: 0.04 THOUSAND/UL (ref 0–0.2)
IMM GRANULOCYTES NFR BLD AUTO: 0 % (ref 0–2)
KETONES UR STRIP-MCNC: NEGATIVE MG/DL
LEUKOCYTE ESTERASE UR QL STRIP: NEGATIVE
LYMPHOCYTES # BLD AUTO: 3.77 THOUSANDS/ÂΜL (ref 0.6–4.47)
LYMPHOCYTES NFR BLD AUTO: 30 % (ref 14–44)
MCH RBC QN AUTO: 30.1 PG (ref 26.8–34.3)
MCHC RBC AUTO-ENTMCNC: 33.6 G/DL (ref 31.4–37.4)
MCV RBC AUTO: 90 FL (ref 82–98)
MONOCYTES # BLD AUTO: 1.04 THOUSAND/ÂΜL (ref 0.17–1.22)
MONOCYTES NFR BLD AUTO: 8 % (ref 4–12)
NEUTROPHILS # BLD AUTO: 7.22 THOUSANDS/ÂΜL (ref 1.85–7.62)
NEUTS SEG NFR BLD AUTO: 59 % (ref 43–75)
NITRITE UR QL STRIP: NEGATIVE
NRBC BLD AUTO-RTO: 0 /100 WBCS
PH UR STRIP.AUTO: 6 [PH]
PLATELET # BLD AUTO: 313 THOUSANDS/UL (ref 149–390)
PMV BLD AUTO: 10.3 FL (ref 8.9–12.7)
POTASSIUM SERPL-SCNC: 3.5 MMOL/L (ref 3.5–5.3)
PROT SERPL-MCNC: 7.9 G/DL (ref 6.4–8.4)
PROT UR STRIP-MCNC: NEGATIVE MG/DL
RBC # BLD AUTO: 4.59 MILLION/UL (ref 3.81–5.12)
SODIUM SERPL-SCNC: 140 MMOL/L (ref 135–147)
SP GR UR STRIP.AUTO: 1.02 (ref 1–1.03)
UROBILINOGEN UR STRIP-ACNC: <2 MG/DL
WBC # BLD AUTO: 12.49 THOUSAND/UL (ref 4.31–10.16)

## 2023-06-13 PROCEDURE — 76830 TRANSVAGINAL US NON-OB: CPT

## 2023-06-13 PROCEDURE — 81003 URINALYSIS AUTO W/O SCOPE: CPT | Performed by: EMERGENCY MEDICINE

## 2023-06-13 PROCEDURE — 80053 COMPREHEN METABOLIC PANEL: CPT | Performed by: EMERGENCY MEDICINE

## 2023-06-13 PROCEDURE — 36415 COLL VENOUS BLD VENIPUNCTURE: CPT | Performed by: EMERGENCY MEDICINE

## 2023-06-13 PROCEDURE — 84703 CHORIONIC GONADOTROPIN ASSAY: CPT | Performed by: EMERGENCY MEDICINE

## 2023-06-13 PROCEDURE — 85025 COMPLETE CBC W/AUTO DIFF WBC: CPT | Performed by: EMERGENCY MEDICINE

## 2023-06-13 PROCEDURE — 76856 US EXAM PELVIC COMPLETE: CPT

## 2023-06-13 RX ADMIN — SODIUM CHLORIDE 1000 ML: 0.9 INJECTION, SOLUTION INTRAVENOUS at 18:27

## 2023-06-13 NOTE — TELEPHONE ENCOUNTER
Pt had an hsg done last Thursday at an out patient facility in [place]  Now she is having pain on her lower left side - she wants to know if she should be concerned  Is trying to conceive

## 2023-06-13 NOTE — TELEPHONE ENCOUNTER
Spoke with patient- Ordering provider at fertility clinic advised patient to go to ER- our recommendation is for her to follow fertility clinics recommendation to report to ER  Patient is aware and verbalized understanding

## 2023-06-13 NOTE — TELEPHONE ENCOUNTER
Spoke with patient, advised her to reach out to physician/facility who did the procedure and they told to call her OBGYN office as they couldn't do anything for her  She reach out to her fertility clinic and is awaiting a call back from them for their advise  She has a virtual visit with them tomorrow to go over test results  She has the HSG showed patent tubes- she was comfortable at first and then she ovulated and had intercourse as they are trying to conceive but now has pain  She is more scared than she is in pain  She is concerned that she may have an infection  Described pain 5/10 on left side bearable but always there, constant, feels it more with stretching and moving

## 2023-06-14 NOTE — ED PROVIDER NOTES
History  Chief Complaint   Patient presents with   • Post-op Problem     Pt concerned for post op infection from HSG procedure that was done Wed morning  Pt c/o tenderness in the abdomen, but no redness or drainage from incision area  Pt was told by obgyn to come in to be evaluated, pt concerned for possible ectopic, stating previously had a blockage in her fallopian tube     23-year-old female patient presents emergency department for increasing pelvic pain  The patient had hsg testing as part of her infertility workup  The patient had increasing pain and because of the increasing pain came in for evaluation  The patient has no vaginal discharge, no vaginal bleeding, but because she is been trying so hard to get pregnant she wanted to make sure that this would not somehow derail her attempts  Patient was being evaluated with a differential diagnosis that is including but not limited to TOA, postop injury, ovarian torsion  History provided by:  Patient   used: No    Pelvic Pain  Severity:  Mild  Onset quality:  Gradual  Timing:  Constant  Progression:  Worsening  Chronicity:  New  Associated symptoms: no chest pain, no cough, no headaches, no rhinorrhea and no sore throat        Prior to Admission Medications   Prescriptions Last Dose Informant Patient Reported? Taking?    cyanocobalamin (VITAMIN B-12) 100 mcg tablet  Self Yes No   Sig: Take 50 mcg by mouth daily   folic acid (FOLVITE) 1 mg tablet  Self Yes No   Sig: Take 1 mg by mouth daily   sertraline (ZOLOFT) 50 mg tablet  Self Yes No   Sig: Take 50 mg by mouth daily      Facility-Administered Medications: None       Past Medical History:   Diagnosis Date   • Female infertility        Past Surgical History:   Procedure Laterality Date   • MOUTH SURGERY  2021   • WISDOM TOOTH EXTRACTION Left        Family History   Problem Relation Age of Onset   • Ovarian cancer Maternal Grandmother      I have reviewed and agree with the history as documented  E-Cigarette/Vaping   • E-Cigarette Use Former User      E-Cigarette/Vaping Substances     Social History     Tobacco Use   • Smoking status: Every Day     Types: Cigarettes   • Smokeless tobacco: Never   Vaping Use   • Vaping Use: Former   Substance Use Topics   • Alcohol use: Not Currently   • Drug use: Yes     Types: Marijuana       Review of Systems   HENT: Negative for rhinorrhea and sore throat  Respiratory: Negative for cough  Cardiovascular: Negative for chest pain  Genitourinary: Positive for pelvic pain  Neurological: Negative for headaches  All other systems reviewed and are negative  Physical Exam  Physical Exam  Vitals and nursing note reviewed  Constitutional:       Appearance: She is well-developed  HENT:      Head: Normocephalic and atraumatic  Right Ear: External ear normal       Left Ear: External ear normal    Eyes:      Conjunctiva/sclera: Conjunctivae normal    Neck:      Thyroid: No thyromegaly  Vascular: No JVD  Trachea: No tracheal deviation  Cardiovascular:      Rate and Rhythm: Normal rate  Pulmonary:      Effort: Pulmonary effort is normal       Breath sounds: Normal breath sounds  No stridor  Abdominal:      General: There is no distension  Palpations: Abdomen is soft  There is no mass  Tenderness: There is no abdominal tenderness  There is no guarding  Hernia: No hernia is present  Musculoskeletal:         General: No tenderness or deformity  Normal range of motion  Lymphadenopathy:      Cervical: No cervical adenopathy  Skin:     General: Skin is warm  Coloration: Skin is not pale  Findings: No erythema or rash  Neurological:      Mental Status: She is alert and oriented to person, place, and time     Psychiatric:         Behavior: Behavior normal          Vital Signs  ED Triage Vitals [06/13/23 1639]   Temperature Pulse Respirations Blood Pressure SpO2   98 °F (36 7 °C) 66 18 127/79 99 %      Temp Source Heart Rate Source Patient Position - Orthostatic VS BP Location FiO2 (%)   Tympanic Monitor Sitting Left arm --      Pain Score       --           Vitals:    06/13/23 1851 06/13/23 1900 06/13/23 2000 06/13/23 2100   BP: 121/68 119/79 127/63 125/73   Pulse: 59 62 62 62   Patient Position - Orthostatic VS:             Visual Acuity      ED Medications  Medications   sodium chloride 0 9 % bolus 1,000 mL (0 mL Intravenous Stopped 6/13/23 2109)       Diagnostic Studies  Results Reviewed     Procedure Component Value Units Date/Time    hCG, qualitative pregnancy [[id number]]  (Normal) Collected: 06/13/23 1827    Lab Status: Final result Specimen: Blood from Arm, Right Updated: 06/13/23 1905     Preg, Serum Negative    UA w Reflex to Microscopic w Reflex to Culture [[id number]] Collected: 06/13/23 1851    Lab Status: Final result Specimen: Urine, Clean Catch Updated: 06/13/23 1900     Color, UA Light Yellow     Clarity, UA Clear     Specific Gravity, UA 1 024     pH, UA 6 0     Leukocytes, UA Negative     Nitrite, UA Negative     Protein, UA Negative mg/dl      Glucose, UA Negative mg/dl      Ketones, UA Negative mg/dl      Urobilinogen, UA <2 0 mg/dl      Bilirubin, UA Negative     Occult Blood, UA Negative    Comprehensive metabolic panel [[id number]] Collected: 06/13/23 1827    Lab Status: Final result Specimen: Blood from Arm, Right Updated: 06/13/23 1848     Sodium 140 mmol/L      Potassium 3 5 mmol/L      Chloride 106 mmol/L      CO2 26 mmol/L      ANION GAP 8 mmol/L      BUN 15 mg/dL      Creatinine 0 76 mg/dL      Glucose 84 mg/dL      Calcium 9 8 mg/dL      AST 14 U/L      ALT 11 U/L      Alkaline Phosphatase 53 U/L      Total Protein 7 9 g/dL      Albumin 5 0 g/dL      Total Bilirubin 0 34 mg/dL      eGFR 107 ml/min/1 73sq m     Narrative:      Honey guidelines for Chronic Kidney Disease (CKD):   •  Stage 1 with normal or high GFR (GFR > 90 mL/min/1 73 square meters)  •  Stage 2 Mild CKD (GFR = 60-89 mL/min/1 73 square meters)  •  Stage 3A Moderate CKD (GFR = 45-59 mL/min/1 73 square meters)  •  Stage 3B Moderate CKD (GFR = 30-44 mL/min/1 73 square meters)  •  Stage 4 Severe CKD (GFR = 15-29 mL/min/1 73 square meters)  •  Stage 5 End Stage CKD (GFR <15 mL/min/1 73 square meters)  Note: GFR calculation is accurate only with a steady state creatinine    CBC and differential [[id number]]  (Abnormal) Collected: 06/13/23 1827    Lab Status: Final result Specimen: Blood from Arm, Right Updated: 06/13/23 1832     WBC 12 49 Thousand/uL      RBC 4 59 Million/uL      Hemoglobin 13 8 g/dL      Hematocrit 41 1 %      MCV 90 fL      MCH 30 1 pg      MCHC 33 6 g/dL      RDW 13 0 %      MPV 10 3 fL      Platelets 656 Thousands/uL      nRBC 0 /100 WBCs      Neutrophils Relative 59 %      Immat GRANS % 0 %      Lymphocytes Relative 30 %      Monocytes Relative 8 %      Eosinophils Relative 3 %      Basophils Relative 0 %      Neutrophils Absolute 7 22 Thousands/µL      Immature Grans Absolute 0 04 Thousand/uL      Lymphocytes Absolute 3 77 Thousands/µL      Monocytes Absolute 1 04 Thousand/µL      Eosinophils Absolute 0 38 Thousand/µL      Basophils Absolute 0 04 Thousands/µL                  US pelvis complete w transvaginal   Final Result by Bernarda León DO (06/13 2058)   Normal uterus and right ovary  Normal left ovary containing what appears to be a small corpus luteum  No evidence of ovarian torsion  No evidence for a tubo-ovarian abscess  Workstation performed: [id number]                    Procedures  Procedures         ED Course                               SBIRT 20yo+    Flowsheet Row Most Recent Value   Initial Alcohol Screen: US AUDIT-C     1  How often do you have a drink containing alcohol? 0 Filed at: 06/13/2023 1812   2  How many drinks containing alcohol do you have on a typical day you are drinking? 0 Filed at: 06/13/2023 1812   3a   Male UNDER 65: How often do you have five or more drinks on one occasion? 0 Filed at: 06/13/2023 1812   3b  FEMALE Any Age, or MALE 65+: How often do you have 4 or more drinks on one occassion? 0 Filed at: 06/13/2023 1812   Audit-C Score 0 Filed at: 06/13/2023 1812   ANGELICA: How many times in the past year have you    Used an illegal drug or used a prescription medication for non-medical reasons? Never Filed at: 06/13/2023 1812                    Medical Decision Making  Pelvic pain: acute illness or injury  Amount and/or Complexity of Data Reviewed  Labs: ordered  Radiology: ordered  Disposition  Final diagnoses:   Pelvic pain     Time reflects when diagnosis was documented in both MDM as applicable and the Disposition within this note     Time User Action Codes Description Comment    6/13/2023  9:19 PM Seamus Griggs Add [R10 2] Pelvic pain       ED Disposition     ED Disposition   Discharge    Condition   Stable    Date/Time   Tue Jun 13, 2023  9:19 PM    Comment   Semaj Hilliard discharge to home/self care  Follow-up Information     Follow up With Specialties Details Why [address], [address]   [address] [place] [address]  [phone]            Discharge Medication List as of 6/13/2023  9:19 PM      CONTINUE these medications which have NOT CHANGED    Details   cyanocobalamin (VITAMIN B-12) 100 mcg tablet Take 50 mcg by mouth daily, Historical Med      folic acid (FOLVITE) 1 mg tablet Take 1 mg by mouth daily, Historical Med      sertraline (ZOLOFT) 50 mg tablet Take 50 mg by mouth daily, Starting Fri 5/5/2023, Historical Med             No discharge procedures on file      PDMP Review     None          ED Provider  Electronically Signed by           Channing Mustafa DO  06/14/23 1903

## 2023-07-17 ENCOUNTER — HOSPITAL ENCOUNTER (EMERGENCY)
Facility: HOSPITAL | Age: 28
Discharge: HOME | End: 2023-07-17
Payer: COMMERCIAL

## 2023-07-17 VITALS
TEMPERATURE: 97.7 F | DIASTOLIC BLOOD PRESSURE: 64 MMHG | HEART RATE: 82 BPM | RESPIRATION RATE: 22 BRPM | SYSTOLIC BLOOD PRESSURE: 117 MMHG | OXYGEN SATURATION: 100 %

## 2023-07-17 DIAGNOSIS — M54.50 ACUTE LOW BACK PAIN: Primary | ICD-10-CM

## 2023-07-17 PROCEDURE — 99284 EMERGENCY DEPT VISIT MOD MDM: CPT | Performed by: PHYSICIAN ASSISTANT

## 2023-07-17 PROCEDURE — 99283 EMERGENCY DEPT VISIT LOW MDM: CPT

## 2023-07-17 RX ORDER — METHOCARBAMOL 750 MG/1
750 TABLET, FILM COATED ORAL EVERY 6 HOURS PRN
Qty: 40 TABLET | Refills: 0 | Status: SHIPPED | OUTPATIENT
Start: 2023-07-17

## 2023-07-17 RX ORDER — IBUPROFEN 400 MG/1
400 TABLET ORAL EVERY 6 HOURS PRN
Qty: 30 TABLET | Refills: 0 | Status: SHIPPED | OUTPATIENT
Start: 2023-07-17 | End: 2023-07-22

## 2023-07-17 RX ORDER — OXYCODONE HYDROCHLORIDE AND ACETAMINOPHEN 5; 325 MG/1; MG/1
1 TABLET ORAL EVERY 6 HOURS PRN
Qty: 12 TABLET | Refills: 0 | Status: SHIPPED | OUTPATIENT
Start: 2023-07-17 | End: 2023-07-20

## 2023-07-17 NOTE — ED PROVIDER NOTES
History  Chief Complaint   Patient presents with   • Back Pain     Reproducible L lower back pain, worse wth movement, no known inj      28 y.o. female presents to the Emergency Department with chief complaint of left lower back pain. Onset of symptoms is reported as 2 to 3 days ago  Location of symptoms is reported as left lower back  Quality of symptoms is reported as sharp tight pain  Severity of symptoms is reported as moderate-severe  Associated symptoms:  Denies urinary retention. Denies bowel or bladder incontinence. Denies abdominal pain. Denies fevers. Denies lower extremity paralysis, paraesthesias or weakness. Denies dysuria, urinary frequency or hematuria. Denies weight loss or night sweats. Modifiers: Movement, bending and twisting exacerbate pain. Rest partially relieves pain. Context:  Patient reports no acute fall or trauma. Denies prior history of IVDA, prolonged steroid use or immunocompromised state. History provided by:  Patient   used: No        Prior to Admission Medications   Prescriptions Last Dose Informant Patient Reported? Taking? cyanocobalamin (VITAMIN B-12) 100 mcg tablet  Self Yes No   Sig: Take 50 mcg by mouth daily   folic acid (FOLVITE) 1 mg tablet  Self Yes No   Sig: Take 1 mg by mouth daily   sertraline (ZOLOFT) 50 mg tablet  Self Yes No   Sig: Take 50 mg by mouth daily      Facility-Administered Medications: None       Past Medical History:   Diagnosis Date   • Female infertility        Past Surgical History:   Procedure Laterality Date   • MOUTH SURGERY  2021   • WISDOM TOOTH EXTRACTION Left        Family History   Problem Relation Age of Onset   • Ovarian cancer Maternal Grandmother      I have reviewed and agree with the history as documented.     E-Cigarette/Vaping   • E-Cigarette Use Former User      E-Cigarette/Vaping Substances     Social History     Tobacco Use   • Smoking status: Every Day     Types: Cigarettes   • Smokeless tobacco: Never   Vaping Use   • Vaping Use: Former   Substance Use Topics   • Alcohol use: Not Currently   • Drug use: Yes     Types: Marijuana       Review of Systems   Constitutional: Negative for chills, diaphoresis, fever and unexpected weight change. Respiratory: Negative for cough, choking, chest tightness, shortness of breath, wheezing and stridor. Cardiovascular: Negative for chest pain, palpitations and leg swelling. Gastrointestinal: Negative for abdominal pain, constipation, diarrhea, nausea and vomiting. Genitourinary: Negative for decreased urine volume, difficulty urinating, dysuria, flank pain, frequency, hematuria and urgency. Musculoskeletal: Positive for back pain. Negative for gait problem, myalgias, neck pain and neck stiffness. Skin: Negative for rash. Neurological: Negative for seizures, syncope, facial asymmetry and numbness. All other systems reviewed and are negative. Physical Exam  Physical Exam  Vitals and nursing note reviewed. Constitutional:       General: She is not in acute distress. Appearance: Normal appearance. Comments: /64 (BP Location: Left arm)   Pulse 82   Temp 97.7 °F (36.5 °C) (Temporal)   Resp 22   SpO2 100%      HENT:      Head: Normocephalic and atraumatic. Right Ear: External ear normal.      Left Ear: External ear normal.      Nose: Nose normal.   Eyes:      General: No scleral icterus. Right eye: No discharge. Left eye: No discharge. Cardiovascular:      Rate and Rhythm: Normal rate. Pulses: Normal pulses. Pulmonary:      Effort: Pulmonary effort is normal.      Breath sounds: Normal breath sounds. Musculoskeletal:         General: No tenderness, deformity or signs of injury. Normal range of motion. Cervical back: Normal range of motion and neck supple. Comments: There is no midline thoracic or lumbar spinal tenderness to palpation.   There is left lumbar paraspinal muscle tenderness to palpation at the L4 level. No bony step offs or deformities on palpation. No saddle anesthesia. Nontender over the costovertebral angle bilaterally. Bilateral lower extremities: The patient is neurovascularly intact in the superficial and deep peroneal, sural, tibial, and saphenous nerve distributions there is normal sensation and good capillary refill within the toes. Strength 5/5 normal to bilateral lower extremities. FROM throughout BLE. No posterior calf pain or palpable cords. Skin:     General: Skin is dry. Coloration: Skin is not jaundiced. Findings: No erythema or rash. Neurological:      General: No focal deficit present. Mental Status: She is alert and oriented to person, place, and time. Mental status is at baseline. Motor: No weakness. Gait: Gait normal.   Psychiatric:         Mood and Affect: Mood normal.         Behavior: Behavior normal.         Thought Content: Thought content normal.         Vital Signs  ED Triage Vitals [07/17/23 1240]   Temperature Pulse Respirations Blood Pressure SpO2   97.7 °F (36.5 °C) 82 22 117/64 100 %      Temp Source Heart Rate Source Patient Position - Orthostatic VS BP Location FiO2 (%)   Temporal Monitor Sitting Left arm --      Pain Score       --           Vitals:    07/17/23 1240   BP: 117/64   Pulse: 82   Patient Position - Orthostatic VS: Sitting         Visual Acuity      ED Medications  Medications - No data to display    Diagnostic Studies  Results Reviewed     None                 No orders to display              Procedures  Procedures         ED Course                               SBIRT 20yo+    Flowsheet Row Most Recent Value   Initial Alcohol Screen: US AUDIT-C     1. How often do you have a drink containing alcohol? 0 Filed at: 07/17/2023 1241   2. How many drinks containing alcohol do you have on a typical day you are drinking? 0 Filed at: 07/17/2023 1241   3a. Male UNDER 65:  How often do you have five or more drinks on one occasion? 0 Filed at: 07/17/2023 1241   3b. FEMALE Any Age, or MALE 65+: How often do you have 4 or more drinks on one occassion? 0 Filed at: 07/17/2023 1241   Audit-C Score 0 Filed at: 07/17/2023 1241   ANGELICA: How many times in the past year have you. .. Used an illegal drug or used a prescription medication for non-medical reasons? Never Filed at: 07/17/2023 1241                    Medical Decision Making  ED Coarse: 68-year-old female presents with atraumatic left lower back pain started a few days ago. Does a lot of heavy lifting at work and suspects this made her symptoms worse. Movement bending and twisting exacerbate symptoms. Pain relieved with rest.  No red flag back pain signs/symptoms on exam:  Pain improved with rest,  Pain relieved by lying flat, worse with bending/twisthing. No swelling to low back or lower extremities, no pulsations in leg or thigh. No severe weakness or loss of sensation to low back, lower extremities, perineum, or genitals. No fevers, chills, nights sweats or unexplained weight loss. No change in color of the skin over the legs or feet (vascular insufficiency) or unhealing lower extremity wounds. No partial or total loss of bladder and/or bowel control, no difficulty in passing urine or having a bowel movement, no hematuria. Pain not severe or intractable in ED. DDX:  ddx includes but is not limited to:  Myofascial pain, diskogenic pain, radicular pain, muscle spasm, vertebral compression fracture, spinal stenosis, spondylosis, cancer, osteoporosis, OA, RA, consider but doubt epidural abscess, cauda equina. Initial ED Plan: Symptomatic treatment of low risk back pain    MDM:  I have reviewed the patient's vital signs, nursing notes, and other relevant ancillary testing/information.  I have had a detailed discussion with the patient regarding the historical points, examination findings, and any diagnostic results    ED Final Assessment Discussed with patient discharge plan of care including rest, use of ice, avoidance of lifting greater than 5 lbs and no bending or twisting. Discussed outpatient use of NSAIDS, and prescribed medications. Instructed regarding follow up with primary care physician in 3-5 days and outpatient neurologist/orthopedist/spine specialist in 5-7 days for further evaluation. Verbally reviewed with patient reasons to return to ED including but not limited to urinary retention, bowel or bladder incontinence, fevers of 100.4 F or higher, lower extremity weakness/paralysis, worsening pain or any other worsening or worrisome symptoms. Patient verbalized understanding and agreement of same. Standard narcotic precautions given. Risk  Prescription drug management. Disposition  Final diagnoses:   Acute low back pain     Time reflects when diagnosis was documented in both MDM as applicable and the Disposition within this note     Time User Action Codes Description Comment    7/17/2023  2:14 PM Jacklyn Chatterjee Add [M54.50] Acute low back pain       ED Disposition     ED Disposition   Discharge    Condition   Stable    Date/Time   Mon Jul 17, 2023  2:14 PM    Comment   Marlon Gardner discharge to home/self care.                Follow-up Information     Follow up With Specialties Details Why Contact Info Additional Deandre Zepeda MD Family Medicine Call in 3 days for further evaluation of symptoms [address] Emergency Department Emergency Medicine Go to  If symptoms worsen [address] [facility] Emergency Department, [place], [place], [address] Program Physical Therapy Call in 2 days for further evaluation of symptoms [phone]          Discharge Medication List as of 7/17/2023 2:21 PM      START taking these medications    Details   ibuprofen (MOTRIN) 400 mg tablet Take 1 tablet (400 mg total) by mouth every 6 (six) hours as needed for moderate pain for up to 5 days, Starting Mon 7/17/2023, Until Sat 7/22/2023 at 2359, Normal      methocarbamol (ROBAXIN) 750 mg tablet Take 1 tablet (750 mg total) by mouth every 6 (six) hours as needed for muscle spasms, Starting Mon 7/17/2023, Normal      oxyCODONE-acetaminophen (PERCOCET) 5-325 mg per tablet Take 1 tablet by mouth every 6 (six) hours as needed for severe pain (dx back pain/initial rx.) for up to 3 days Label no driving no etoh. Initial rx.   Dx: Max Daily Amount: 4 tablets, Starting Mon 7/17/2023, Until Thu 7/20/2023 at 2359, Normal         CONTINUE these medications which have NOT CHANGED    Details   cyanocobalamin (VITAMIN B-12) 100 mcg tablet Take 50 mcg by mouth daily, Historical Med      folic acid (FOLVITE) 1 mg tablet Take 1 mg by mouth daily, Historical Med      sertraline (ZOLOFT) 50 mg tablet Take 50 mg by mouth daily, Starting Fri 5/5/2023, Historical Med                 PDMP Review     None          ED Provider  Electronically Signed by           Shannon Hammond PA-C  07/17/23 5560

## 2023-07-18 ENCOUNTER — TELEPHONE (OUTPATIENT)
Dept: PHYSICAL THERAPY | Facility: OTHER | Age: 28
End: 2023-07-18

## 2023-07-18 NOTE — TELEPHONE ENCOUNTER
Call placed to the patient per [facility] referral.    Spoke with the patient who would like to give it more time, prior to triage. She intends on taking it easy, and seeing if the pain will subside. Patient encouraged to call [facility] for PT eval triage in the future.      closed

## 2023-12-29 ENCOUNTER — OFFICE VISIT (OUTPATIENT)
Dept: URGENT CARE | Facility: CLINIC | Age: 28
End: 2023-12-29
Payer: COMMERCIAL

## 2023-12-29 VITALS
OXYGEN SATURATION: 99 % | DIASTOLIC BLOOD PRESSURE: 89 MMHG | TEMPERATURE: 97.8 F | RESPIRATION RATE: 20 BRPM | SYSTOLIC BLOOD PRESSURE: 110 MMHG | HEART RATE: 81 BPM

## 2023-12-29 DIAGNOSIS — R05.1 ACUTE COUGH: Primary | ICD-10-CM

## 2023-12-29 LAB
SARS-COV-2 AG UPPER RESP QL IA: NEGATIVE
VALID CONTROL: NORMAL

## 2023-12-29 PROCEDURE — 87811 SARS-COV-2 COVID19 W/OPTIC: CPT

## 2023-12-29 PROCEDURE — 99213 OFFICE O/P EST LOW 20 MIN: CPT

## 2023-12-29 RX ORDER — AZITHROMYCIN 250 MG/1
TABLET, FILM COATED ORAL
Qty: 6 TABLET | Refills: 0 | Status: SHIPPED | OUTPATIENT
Start: 2023-12-29 | End: 2024-01-02

## 2023-12-29 RX ORDER — ALBUTEROL SULFATE 90 UG/1
2 AEROSOL, METERED RESPIRATORY (INHALATION) EVERY 6 HOURS PRN
Qty: 6.7 G | Refills: 0 | Status: SHIPPED | OUTPATIENT
Start: 2023-12-29

## 2023-12-29 RX ORDER — METHYLPREDNISOLONE 4 MG/1
TABLET ORAL
Qty: 21 TABLET | Refills: 0 | Status: SHIPPED | OUTPATIENT
Start: 2023-12-29

## 2023-12-29 NOTE — PROGRESS NOTES
[facility]        NAME: Huong Dalal is a 28 y.o. female  : 1995    MRN: [id number]  DATE: 2023  TIME: 12:01 PM    Assessment and Plan   Acute cough [R05.1]  1. Acute cough  Poct Covid 19 Rapid Antigen Test            Patient Instructions     Cough  Proventil 2 puffs every 6 hours as needed  Medrol dose pack as directed  If no improvement in 3 days start antibiotics  Follow up with PCP in 3-5 days.  Proceed to  ER if symptoms worsen.    Chief Complaint     Chief Complaint   Patient presents with    Cold Like Symptoms     Pt c/o cough, sore throat, aches, SOB, nasal/sinus congestion, headache, and left earache for the past 5 days with symptoms worsening the past 3. Pt also stated has loss of taste and smell. Taking OTC sudafed and advil, last doses 10am this morning.          History of Present Illness       28-year-old female who presents complaining of cough productive of yellow sputum, congestion, body aches, chills, wheezing x 6 days.  Patient denies chest pain, diaphoresis.    Shortness of Breath  Associated symptoms include coughing and rhinorrhea. Pertinent negatives include no fatigue, sore throat, stridor or wheezing.       Review of Systems   Review of Systems   Constitutional:  Positive for fever. Negative for activity change, appetite change, chills, diaphoresis and fatigue.   HENT:  Positive for congestion and rhinorrhea. Negative for ear discharge, ear pain, facial swelling, hearing loss, mouth sores, nosebleeds, postnasal drip, sinus pressure, sinus pain, sneezing, sore throat and voice change.    Respiratory:  Positive for cough. Negative for apnea, choking, chest tightness, shortness of breath, wheezing and stridor.    Cardiovascular: Negative.          Current Medications       Current Outpatient Medications:     folic acid (FOLVITE) 1 mg tablet, Take 1 mg by mouth daily, Disp: , Rfl:     cyanocobalamin (VITAMIN B-12) 100 mcg tablet, Take 50 mcg by mouth daily  (Patient not taking: Reported on 12/29/2023), Disp: , Rfl:     ibuprofen (MOTRIN) 400 mg tablet, Take 1 tablet (400 mg total) by mouth every 6 (six) hours as needed for moderate pain for up to 5 days, Disp: 30 tablet, Rfl: 0    methocarbamol (ROBAXIN) 750 mg tablet, Take 1 tablet (750 mg total) by mouth every 6 (six) hours as needed for muscle spasms (Patient not taking: Reported on 12/29/2023), Disp: 40 tablet, Rfl: 0    sertraline (ZOLOFT) 50 mg tablet, Take 50 mg by mouth daily (Patient not taking: Reported on 12/29/2023), Disp: , Rfl:     Current Allergies     Allergies as of 12/29/2023    (No Known Allergies)            The following portions of the patient's history were reviewed and updated as appropriate: allergies, current medications, past family history, past medical history, past social history, past surgical history and problem list.     Past Medical History:   Diagnosis Date    Female infertility        Past Surgical History:   Procedure Laterality Date    MOUTH SURGERY  2021    WISDOM TOOTH EXTRACTION Left        Family History   Problem Relation Age of Onset    Ovarian cancer Maternal Grandmother          Medications have been verified.        Objective   /89   Pulse 81   Temp 97.8 °F (36.6 °C)   Resp 20   SpO2 99%        Physical Exam     Physical Exam  Constitutional:       General: She is not in acute distress.     Appearance: Normal appearance. She is well-developed. She is not diaphoretic.   HENT:      Head: Normocephalic and atraumatic.      Right Ear: Hearing, tympanic membrane, ear canal and external ear normal.      Left Ear: Hearing, tympanic membrane, ear canal and external ear normal.      Nose: Rhinorrhea present.      Mouth/Throat:      Pharynx: Uvula midline.   Cardiovascular:      Rate and Rhythm: Normal rate and regular rhythm.      Heart sounds: Normal heart sounds.   Pulmonary:      Effort: Pulmonary effort is normal. No respiratory distress.      Breath sounds: Normal  breath sounds. No stridor. No wheezing, rhonchi or rales.   Chest:      Chest wall: No tenderness.   Musculoskeletal:      Cervical back: Normal range of motion and neck supple.   Lymphadenopathy:      Cervical: Cervical adenopathy present.   Neurological:      Mental Status: She is alert.

## 2023-12-29 NOTE — PATIENT INSTRUCTIONS
Cough  Proventil 2 puffs every 6 hours as needed  Medrol dose pack as directed  If no improvement in 3 days start antibiotics  Follow up with PCP in 3-5 days.  Proceed to  ER if symptoms worsen.

## 2024-10-17 ENCOUNTER — VBI (OUTPATIENT)
Dept: ADMINISTRATIVE | Facility: OTHER | Age: 29
End: 2024-10-17

## 2024-10-17 NOTE — TELEPHONE ENCOUNTER
10/17/24 7:06 AM     Chart reviewed for Diabetic Eye Exam was/were submitted to the patient's insurance.     KAITY MCRAE MA   [facility]